# Patient Record
Sex: MALE
[De-identification: names, ages, dates, MRNs, and addresses within clinical notes are randomized per-mention and may not be internally consistent; named-entity substitution may affect disease eponyms.]

---

## 2017-06-15 PROBLEM — M25.562 PAIN IN LEFT KNEE: Status: ACTIVE | Noted: 2017-06-15

## 2017-06-15 PROBLEM — Z00.00 ENCOUNTER FOR PREVENTIVE HEALTH EXAMINATION: Status: ACTIVE | Noted: 2017-06-15

## 2017-06-19 ENCOUNTER — APPOINTMENT (OUTPATIENT)
Dept: ORTHOPEDIC SURGERY | Facility: CLINIC | Age: 67
End: 2017-06-19

## 2017-06-19 VITALS
DIASTOLIC BLOOD PRESSURE: 88 MMHG | HEIGHT: 71 IN | SYSTOLIC BLOOD PRESSURE: 173 MMHG | WEIGHT: 217 LBS | HEART RATE: 49 BPM | BODY MASS INDEX: 30.38 KG/M2

## 2017-06-19 DIAGNOSIS — Z86.39 PERSONAL HISTORY OF OTHER ENDOCRINE, NUTRITIONAL AND METABOLIC DISEASE: ICD-10-CM

## 2017-06-19 DIAGNOSIS — Z80.9 FAMILY HISTORY OF MALIGNANT NEOPLASM, UNSPECIFIED: ICD-10-CM

## 2017-06-19 DIAGNOSIS — Z86.79 PERSONAL HISTORY OF OTHER DISEASES OF THE CIRCULATORY SYSTEM: ICD-10-CM

## 2017-06-19 DIAGNOSIS — Z87.39 PERSONAL HISTORY OF OTHER DISEASES OF THE MUSCULOSKELETAL SYSTEM AND CONNECTIVE TISSUE: ICD-10-CM

## 2017-06-19 DIAGNOSIS — Z96.652 PRESENCE OF LEFT ARTIFICIAL KNEE JOINT: ICD-10-CM

## 2017-06-19 DIAGNOSIS — M25.562 PAIN IN LEFT KNEE: ICD-10-CM

## 2017-06-19 DIAGNOSIS — Z85.9 PERSONAL HISTORY OF MALIGNANT NEOPLASM, UNSPECIFIED: ICD-10-CM

## 2017-06-19 DIAGNOSIS — Z78.9 OTHER SPECIFIED HEALTH STATUS: ICD-10-CM

## 2017-06-19 DIAGNOSIS — E78.00 PURE HYPERCHOLESTEROLEMIA, UNSPECIFIED: ICD-10-CM

## 2017-06-19 DIAGNOSIS — Z82.61 FAMILY HISTORY OF ARTHRITIS: ICD-10-CM

## 2017-06-19 DIAGNOSIS — Z96.651 PRESENCE OF RIGHT ARTIFICIAL KNEE JOINT: ICD-10-CM

## 2017-06-19 DIAGNOSIS — M76.892 OTHER SPECIFIED ENTHESOPATHIES OF LEFT LOWER LIMB, EXCLUDING FOOT: ICD-10-CM

## 2017-06-19 DIAGNOSIS — M17.0 BILATERAL PRIMARY OSTEOARTHRITIS OF KNEE: ICD-10-CM

## 2017-06-19 RX ORDER — ASPIRIN 81 MG/1
81 TABLET, CHEWABLE ORAL
Refills: 0 | Status: ACTIVE | COMMUNITY

## 2017-06-19 RX ORDER — METFORMIN HYDROCHLORIDE 500 MG/1
500 TABLET, COATED ORAL
Refills: 0 | Status: ACTIVE | COMMUNITY

## 2017-06-19 RX ORDER — VALSARTAN 40 MG/1
40 TABLET, COATED ORAL
Refills: 0 | Status: ACTIVE | COMMUNITY

## 2017-06-19 RX ORDER — ATORVASTATIN CALCIUM 10 MG/1
10 TABLET, FILM COATED ORAL
Refills: 0 | Status: ACTIVE | COMMUNITY

## 2017-12-27 VITALS
OXYGEN SATURATION: 97 % | RESPIRATION RATE: 16 BRPM | HEART RATE: 68 BPM | WEIGHT: 220.9 LBS | SYSTOLIC BLOOD PRESSURE: 164 MMHG | TEMPERATURE: 97 F | HEIGHT: 71 IN | DIASTOLIC BLOOD PRESSURE: 79 MMHG

## 2017-12-27 NOTE — PATIENT PROFILE ADULT. - PMH
Bladder cancer    CAD (coronary artery disease)    Depression    Diabetes    High triglycerides    HLD (hyperlipidemia)    HTN (hypertension)    NEHA on CPAP    Prostate cancer

## 2017-12-27 NOTE — PATIENT PROFILE ADULT. - PSH
S/P Achilles tendon repair  Right, 2010  S/P hernia surgery  umbilical, 2015  S/P knee replacement  Right, 2010  S/P knee replacement  left, 2011  Surgery, elective  Left achilles tendon, 2011  Surgery, elective  prostate curgery, 2012  Surgery, elective  Bladder surgery, 1986  Surgery, elective  Kidney stone, 2005  Surgery, elective  Left elbow surgery, 2010  Surgery, elective  Right elbow surgery, 2002  Surgery, elective  cardiac Stent x3 Elective surgery  neck mass removed-2015  S/P Achilles tendon repair  Right, 2010  S/P hernia surgery  umbilical, 2015  S/P knee replacement  Right, 2010  S/P knee replacement  left, 2011  Surgery, elective  Left achilles tendon, 2011  Surgery, elective  prostate curgery, 2012  Surgery, elective  Bladder surgery, 1986  Surgery, elective  Kidney stone, 2005  Surgery, elective  Left elbow surgery, 2010  Surgery, elective  Right elbow surgery, 2002  Surgery, elective  cardiac Stent x3

## 2017-12-28 ENCOUNTER — INPATIENT (INPATIENT)
Facility: HOSPITAL | Age: 67
LOS: 0 days | Discharge: HOME | DRG: 710 | End: 2017-12-29
Attending: UROLOGY | Admitting: UROLOGY
Payer: COMMERCIAL

## 2017-12-28 DIAGNOSIS — Z41.9 ENCOUNTER FOR PROCEDURE FOR PURPOSES OTHER THAN REMEDYING HEALTH STATE, UNSPECIFIED: Chronic | ICD-10-CM

## 2017-12-28 DIAGNOSIS — Z96.659 PRESENCE OF UNSPECIFIED ARTIFICIAL KNEE JOINT: Chronic | ICD-10-CM

## 2017-12-28 DIAGNOSIS — N52.31 ERECTILE DYSFUNCTION FOLLOWING RADICAL PROSTATECTOMY: ICD-10-CM

## 2017-12-28 DIAGNOSIS — Z98.890 OTHER SPECIFIED POSTPROCEDURAL STATES: Chronic | ICD-10-CM

## 2017-12-28 LAB
GLUCOSE BLDC GLUCOMTR-MCNC: 111 MG/DL — HIGH (ref 70–99)
GLUCOSE BLDC GLUCOMTR-MCNC: 152 MG/DL — HIGH (ref 70–99)
GLUCOSE BLDC GLUCOMTR-MCNC: 155 MG/DL — HIGH (ref 70–99)
GLUCOSE BLDC GLUCOMTR-MCNC: 180 MG/DL — HIGH (ref 70–99)

## 2017-12-28 RX ORDER — GENTAMICIN SULFATE 40 MG/ML
80 VIAL (ML) INJECTION ONCE
Qty: 0 | Refills: 0 | Status: DISCONTINUED | OUTPATIENT
Start: 2017-12-28 | End: 2017-12-28

## 2017-12-28 RX ORDER — HYDROMORPHONE HYDROCHLORIDE 2 MG/ML
0.5 INJECTION INTRAMUSCULAR; INTRAVENOUS; SUBCUTANEOUS
Qty: 0 | Refills: 0 | Status: DISCONTINUED | OUTPATIENT
Start: 2017-12-28 | End: 2017-12-29

## 2017-12-28 RX ORDER — SODIUM CHLORIDE 9 MG/ML
1000 INJECTION, SOLUTION INTRAVENOUS
Qty: 0 | Refills: 0 | Status: DISCONTINUED | OUTPATIENT
Start: 2017-12-28 | End: 2017-12-29

## 2017-12-28 RX ORDER — VALSARTAN 80 MG/1
160 TABLET ORAL DAILY
Qty: 0 | Refills: 0 | Status: DISCONTINUED | OUTPATIENT
Start: 2017-12-28 | End: 2017-12-29

## 2017-12-28 RX ORDER — INSULIN LISPRO 100/ML
VIAL (ML) SUBCUTANEOUS
Qty: 0 | Refills: 0 | Status: DISCONTINUED | OUTPATIENT
Start: 2017-12-28 | End: 2017-12-29

## 2017-12-28 RX ORDER — DEXTROSE 50 % IN WATER 50 %
25 SYRINGE (ML) INTRAVENOUS ONCE
Qty: 0 | Refills: 0 | Status: DISCONTINUED | OUTPATIENT
Start: 2017-12-28 | End: 2017-12-29

## 2017-12-28 RX ORDER — DEXTROSE 50 % IN WATER 50 %
12.5 SYRINGE (ML) INTRAVENOUS ONCE
Qty: 0 | Refills: 0 | Status: DISCONTINUED | OUTPATIENT
Start: 2017-12-28 | End: 2017-12-29

## 2017-12-28 RX ORDER — GLUCAGON INJECTION, SOLUTION 0.5 MG/.1ML
1 INJECTION, SOLUTION SUBCUTANEOUS ONCE
Qty: 0 | Refills: 0 | Status: DISCONTINUED | OUTPATIENT
Start: 2017-12-28 | End: 2017-12-29

## 2017-12-28 RX ORDER — HEPARIN SODIUM 5000 [USP'U]/ML
5000 INJECTION INTRAVENOUS; SUBCUTANEOUS EVERY 8 HOURS
Qty: 0 | Refills: 0 | Status: DISCONTINUED | OUTPATIENT
Start: 2017-12-28 | End: 2017-12-29

## 2017-12-28 RX ORDER — METFORMIN HYDROCHLORIDE 850 MG/1
1000 TABLET ORAL EVERY 12 HOURS
Qty: 0 | Refills: 0 | Status: DISCONTINUED | OUTPATIENT
Start: 2017-12-28 | End: 2017-12-28

## 2017-12-28 RX ORDER — OXYCODONE AND ACETAMINOPHEN 5; 325 MG/1; MG/1
1 TABLET ORAL EVERY 4 HOURS
Qty: 0 | Refills: 0 | Status: DISCONTINUED | OUTPATIENT
Start: 2017-12-28 | End: 2017-12-29

## 2017-12-28 RX ORDER — VALSARTAN 80 MG/1
1 TABLET ORAL
Qty: 0 | Refills: 0 | COMMUNITY

## 2017-12-28 RX ORDER — VANCOMYCIN HCL 1 G
1000 VIAL (EA) INTRAVENOUS ONCE
Qty: 0 | Refills: 0 | Status: COMPLETED | OUTPATIENT
Start: 2017-12-28 | End: 2017-12-28

## 2017-12-28 RX ORDER — ATORVASTATIN CALCIUM 80 MG/1
40 TABLET, FILM COATED ORAL AT BEDTIME
Qty: 0 | Refills: 0 | Status: DISCONTINUED | OUTPATIENT
Start: 2017-12-28 | End: 2017-12-29

## 2017-12-28 RX ORDER — OXYCODONE AND ACETAMINOPHEN 5; 325 MG/1; MG/1
2 TABLET ORAL EVERY 6 HOURS
Qty: 0 | Refills: 0 | Status: DISCONTINUED | OUTPATIENT
Start: 2017-12-28 | End: 2017-12-29

## 2017-12-28 RX ORDER — METFORMIN HYDROCHLORIDE 850 MG/1
2000 TABLET ORAL
Qty: 0 | Refills: 0 | COMMUNITY

## 2017-12-28 RX ORDER — SODIUM CHLORIDE 9 MG/ML
1000 INJECTION INTRAMUSCULAR; INTRAVENOUS; SUBCUTANEOUS
Qty: 0 | Refills: 0 | Status: DISCONTINUED | OUTPATIENT
Start: 2017-12-28 | End: 2017-12-28

## 2017-12-28 RX ORDER — DEXTROSE 50 % IN WATER 50 %
1 SYRINGE (ML) INTRAVENOUS ONCE
Qty: 0 | Refills: 0 | Status: DISCONTINUED | OUTPATIENT
Start: 2017-12-28 | End: 2017-12-29

## 2017-12-28 RX ORDER — SENNA PLUS 8.6 MG/1
1 TABLET ORAL AT BEDTIME
Qty: 0 | Refills: 0 | Status: DISCONTINUED | OUTPATIENT
Start: 2017-12-28 | End: 2017-12-29

## 2017-12-28 RX ORDER — ATORVASTATIN CALCIUM 80 MG/1
1 TABLET, FILM COATED ORAL
Qty: 0 | Refills: 0 | COMMUNITY

## 2017-12-28 RX ORDER — SODIUM CHLORIDE 9 MG/ML
3 INJECTION INTRAMUSCULAR; INTRAVENOUS; SUBCUTANEOUS EVERY 8 HOURS
Qty: 0 | Refills: 0 | Status: DISCONTINUED | OUTPATIENT
Start: 2017-12-28 | End: 2017-12-29

## 2017-12-28 RX ORDER — ONDANSETRON 8 MG/1
4 TABLET, FILM COATED ORAL EVERY 6 HOURS
Qty: 0 | Refills: 0 | Status: DISCONTINUED | OUTPATIENT
Start: 2017-12-28 | End: 2017-12-29

## 2017-12-28 RX ORDER — CEPHALEXIN 500 MG
500 CAPSULE ORAL
Qty: 0 | Refills: 0 | Status: DISCONTINUED | OUTPATIENT
Start: 2017-12-28 | End: 2017-12-29

## 2017-12-28 RX ORDER — ASPIRIN/CALCIUM CARB/MAGNESIUM 324 MG
1 TABLET ORAL
Qty: 0 | Refills: 0 | COMMUNITY

## 2017-12-28 RX ORDER — DOCUSATE SODIUM 100 MG
100 CAPSULE ORAL THREE TIMES A DAY
Qty: 0 | Refills: 0 | Status: DISCONTINUED | OUTPATIENT
Start: 2017-12-28 | End: 2017-12-29

## 2017-12-28 RX ADMIN — VALSARTAN 160 MILLIGRAM(S): 80 TABLET ORAL at 18:45

## 2017-12-28 RX ADMIN — OXYCODONE AND ACETAMINOPHEN 2 TABLET(S): 5; 325 TABLET ORAL at 22:40

## 2017-12-28 RX ADMIN — OXYCODONE AND ACETAMINOPHEN 2 TABLET(S): 5; 325 TABLET ORAL at 23:10

## 2017-12-28 RX ADMIN — Medication 500 MILLIGRAM(S): at 18:45

## 2017-12-28 RX ADMIN — SODIUM CHLORIDE 100 MILLILITER(S): 9 INJECTION INTRAMUSCULAR; INTRAVENOUS; SUBCUTANEOUS at 14:09

## 2017-12-28 RX ADMIN — HEPARIN SODIUM 5000 UNIT(S): 5000 INJECTION INTRAVENOUS; SUBCUTANEOUS at 14:57

## 2017-12-28 RX ADMIN — Medication 250 MILLIGRAM(S): at 07:19

## 2017-12-28 RX ADMIN — HEPARIN SODIUM 5000 UNIT(S): 5000 INJECTION INTRAVENOUS; SUBCUTANEOUS at 22:29

## 2017-12-28 RX ADMIN — SENNA PLUS 1 TABLET(S): 8.6 TABLET ORAL at 22:28

## 2017-12-28 RX ADMIN — Medication: at 12:45

## 2017-12-28 RX ADMIN — SODIUM CHLORIDE 3 MILLILITER(S): 9 INJECTION INTRAMUSCULAR; INTRAVENOUS; SUBCUTANEOUS at 22:30

## 2017-12-28 RX ADMIN — ATORVASTATIN CALCIUM 40 MILLIGRAM(S): 80 TABLET, FILM COATED ORAL at 22:28

## 2017-12-28 RX ADMIN — Medication 100 MILLIGRAM(S): at 22:29

## 2017-12-28 RX ADMIN — Medication 1: at 22:29

## 2017-12-28 NOTE — BRIEF OPERATIVE NOTE - PROCEDURE
<<-----Click on this checkbox to enter Procedure Penile prosthesis placement  12/28/2017  multi-component penile prosthesis  Active  [id number]

## 2017-12-28 NOTE — PROGRESS NOTE ADULT - SUBJECTIVE AND OBJECTIVE BOX
POC    Patient is a 67y old  Male who presents with a chief complaint of erectile dysfunction (28 Dec 2017 11:47) s/p IPP            Vital Signs Last 24 Hrs  T(C): 35.9 (28 Dec 2017 14:46), Max: 36.4 (28 Dec 2017 13:30)  T(F): 96.7 (28 Dec 2017 14:46), Max: 97.5 (28 Dec 2017 13:30)  HR: 83 (28 Dec 2017 14:46) (48 - 83)  BP: 177/84 (28 Dec 2017 14:46) (105/60 - 177/84)  BP(mean): 93 (28 Dec 2017 14:00) (77 - 104)  RR: 19 (28 Dec 2017 14:46) (10 - 19)  SpO2: 100% (28 Dec 2017 14:46) (96% - 100%)    I&O's Summary    28 Dec 2017 07:01  -  28 Dec 2017 15:00  --------------------------------------------------------  IN: 300 mL / OUT: 300 mL / NET: 0 mL        Gen:    Abd:    :            cultures    A/P  POC    Patient is a 67y old  Male who presents with a chief complaint of erectile dysfunction (28 Dec 2017 11:47) s/p IPP under spinal anesthesia    Pt reports sensation returning to lower extremities. He denies CP, SOB, n/v    Vital Signs Last 24 Hrs  T(C): 35.9 (28 Dec 2017 14:46), Max: 36.4 (28 Dec 2017 13:30)  T(F): 96.7 (28 Dec 2017 14:46), Max: 97.5 (28 Dec 2017 13:30)  HR: 83 (28 Dec 2017 14:46) (48 - 83)  BP: 177/84 (28 Dec 2017 14:46) (105/60 - 177/84)  BP(mean): 93 (28 Dec 2017 14:00) (77 - 104)  RR: 19 (28 Dec 2017 14:46) (10 - 19)  SpO2: 100% (28 Dec 2017 14:46) (96% - 100%)    I&O's Summary    28 Dec 2017 07:01  -  28 Dec 2017 15:00  --------------------------------------------------------  IN: 300 mL / OUT: 300 mL / NET: 0 mL        Gen: NAD    Abd: soft, NTND, no guarding    : FC intact and draining clear yellow urine, louise ttp, +scrotal support       cultures    A/P  67M s/p IPP  -cont abx  -monitor UO  -dvt ppx  -scrotal support  -ISS  -DM diet  -bowel regimen  -pain meds PRN

## 2017-12-28 NOTE — H&P ADULT - PSH
Elective surgery  neck mass removed-2015  S/P Achilles tendon repair  Right, 2010  S/P hernia surgery  umbilical, 2015  S/P knee replacement  Right, 2010  S/P knee replacement  left, 2011  Surgery, elective  Left achilles tendon, 2011  Surgery, elective  prostate curgery, 2012  Surgery, elective  Bladder surgery, 1986  Surgery, elective  Kidney stone, 2005  Surgery, elective  Left elbow surgery, 2010  Surgery, elective  Right elbow surgery, 2002  Surgery, elective  cardiac Stent x3

## 2017-12-28 NOTE — H&P ADULT - HISTORY OF PRESENT ILLNESS
67 y.o. gentleman s/p radical prostatectomy for prostate cancer with erectile dysfunction of vasculogenic cause as well. Patient attempted to use medical therapy for this erectile dysfunction but due to lack of efficacy, he elected to have a multi-component inflatable penile prosthesis placed instead.

## 2017-12-29 ENCOUNTER — TRANSCRIPTION ENCOUNTER (OUTPATIENT)
Age: 67
End: 2017-12-29

## 2017-12-29 VITALS
HEART RATE: 81 BPM | DIASTOLIC BLOOD PRESSURE: 74 MMHG | OXYGEN SATURATION: 100 % | RESPIRATION RATE: 16 BRPM | SYSTOLIC BLOOD PRESSURE: 137 MMHG | TEMPERATURE: 99 F

## 2017-12-29 LAB
ALBUMIN SERPL ELPH-MCNC: 3.6 G/DL — SIGNIFICANT CHANGE UP (ref 3.3–5)
ALP SERPL-CCNC: 39 U/L — LOW (ref 40–120)
ALT FLD-CCNC: 12 U/L — SIGNIFICANT CHANGE UP (ref 10–45)
ANION GAP SERPL CALC-SCNC: 10 MMOL/L — SIGNIFICANT CHANGE UP (ref 5–17)
AST SERPL-CCNC: 13 U/L — SIGNIFICANT CHANGE UP (ref 10–40)
BASOPHILS NFR BLD AUTO: 0.9 % — SIGNIFICANT CHANGE UP (ref 0–2)
BILIRUB SERPL-MCNC: 0.6 MG/DL — SIGNIFICANT CHANGE UP (ref 0.2–1.2)
BUN SERPL-MCNC: 34 MG/DL — HIGH (ref 7–23)
CALCIUM SERPL-MCNC: 9.4 MG/DL — SIGNIFICANT CHANGE UP (ref 8.4–10.5)
CHLORIDE SERPL-SCNC: 102 MMOL/L — SIGNIFICANT CHANGE UP (ref 96–108)
CO2 SERPL-SCNC: 24 MMOL/L — SIGNIFICANT CHANGE UP (ref 22–31)
CREAT SERPL-MCNC: 1.95 MG/DL — HIGH (ref 0.5–1.3)
EOSINOPHIL NFR BLD AUTO: 1.8 % — SIGNIFICANT CHANGE UP (ref 0–6)
GLUCOSE BLDC GLUCOMTR-MCNC: 134 MG/DL — HIGH (ref 70–99)
GLUCOSE BLDC GLUCOMTR-MCNC: 212 MG/DL — HIGH (ref 70–99)
GLUCOSE SERPL-MCNC: 172 MG/DL — HIGH (ref 70–99)
HBA1C BLD-MCNC: 7.6 % — HIGH (ref 4–5.6)
HCT VFR BLD CALC: 39.3 % — SIGNIFICANT CHANGE UP (ref 39–50)
HGB BLD-MCNC: 13 G/DL — SIGNIFICANT CHANGE UP (ref 13–17)
LYMPHOCYTES # BLD AUTO: 13.4 % — SIGNIFICANT CHANGE UP (ref 13–44)
MAGNESIUM SERPL-MCNC: 1.6 MG/DL — SIGNIFICANT CHANGE UP (ref 1.6–2.6)
MCHC RBC-ENTMCNC: 29.5 PG — SIGNIFICANT CHANGE UP (ref 27–34)
MCHC RBC-ENTMCNC: 33.1 G/DL — SIGNIFICANT CHANGE UP (ref 32–36)
MCV RBC AUTO: 89.3 FL — SIGNIFICANT CHANGE UP (ref 80–100)
MONOCYTES NFR BLD AUTO: 9.4 % — SIGNIFICANT CHANGE UP (ref 2–14)
NEUTROPHILS NFR BLD AUTO: 74.5 % — SIGNIFICANT CHANGE UP (ref 43–77)
PHOSPHATE SERPL-MCNC: 2.5 MG/DL — SIGNIFICANT CHANGE UP (ref 2.5–4.5)
PLATELET # BLD AUTO: 155 K/UL — SIGNIFICANT CHANGE UP (ref 150–400)
POTASSIUM SERPL-MCNC: 5 MMOL/L — SIGNIFICANT CHANGE UP (ref 3.5–5.3)
POTASSIUM SERPL-SCNC: 5 MMOL/L — SIGNIFICANT CHANGE UP (ref 3.5–5.3)
PROT SERPL-MCNC: 6.2 G/DL — SIGNIFICANT CHANGE UP (ref 6–8.3)
RBC # BLD: 4.4 M/UL — SIGNIFICANT CHANGE UP (ref 4.2–5.8)
RBC # FLD: 14.6 % — SIGNIFICANT CHANGE UP (ref 10.3–16.9)
SODIUM SERPL-SCNC: 136 MMOL/L — SIGNIFICANT CHANGE UP (ref 135–145)
WBC # BLD: 11.5 K/UL — HIGH (ref 3.8–10.5)
WBC # FLD AUTO: 11.5 K/UL — HIGH (ref 3.8–10.5)

## 2017-12-29 PROCEDURE — 90662 IIV NO PRSV INCREASED AG IM: CPT

## 2017-12-29 PROCEDURE — 82962 GLUCOSE BLOOD TEST: CPT

## 2017-12-29 PROCEDURE — C1813: CPT

## 2017-12-29 PROCEDURE — 54405 INSERT MULTI-COMP PENIS PROS: CPT

## 2017-12-29 PROCEDURE — 84100 ASSAY OF PHOSPHORUS: CPT

## 2017-12-29 PROCEDURE — 83735 ASSAY OF MAGNESIUM: CPT

## 2017-12-29 PROCEDURE — 36415 COLL VENOUS BLD VENIPUNCTURE: CPT

## 2017-12-29 PROCEDURE — 83036 HEMOGLOBIN GLYCOSYLATED A1C: CPT

## 2017-12-29 PROCEDURE — 80053 COMPREHEN METABOLIC PANEL: CPT

## 2017-12-29 PROCEDURE — 85025 COMPLETE CBC W/AUTO DIFF WBC: CPT

## 2017-12-29 RX ORDER — CEPHALEXIN 500 MG
1 CAPSULE ORAL
Qty: 21 | Refills: 0 | OUTPATIENT
Start: 2017-12-29 | End: 2018-01-04

## 2017-12-29 RX ORDER — MAGNESIUM SULFATE 500 MG/ML
1 VIAL (ML) INJECTION ONCE
Qty: 0 | Refills: 0 | Status: COMPLETED | OUTPATIENT
Start: 2017-12-29 | End: 2017-12-29

## 2017-12-29 RX ORDER — DOCUSATE SODIUM 100 MG
1 CAPSULE ORAL
Qty: 30 | Refills: 0 | OUTPATIENT
Start: 2017-12-29

## 2017-12-29 RX ORDER — INFLUENZA VIRUS VACCINE 15; 15; 15; 15 UG/.5ML; UG/.5ML; UG/.5ML; UG/.5ML
0.5 SUSPENSION INTRAMUSCULAR ONCE
Qty: 0 | Refills: 0 | Status: COMPLETED | OUTPATIENT
Start: 2017-12-29 | End: 2017-12-29

## 2017-12-29 RX ADMIN — OXYCODONE AND ACETAMINOPHEN 2 TABLET(S): 5; 325 TABLET ORAL at 06:54

## 2017-12-29 RX ADMIN — SODIUM CHLORIDE 3 MILLILITER(S): 9 INJECTION INTRAMUSCULAR; INTRAVENOUS; SUBCUTANEOUS at 06:26

## 2017-12-29 RX ADMIN — HEPARIN SODIUM 5000 UNIT(S): 5000 INJECTION INTRAVENOUS; SUBCUTANEOUS at 13:19

## 2017-12-29 RX ADMIN — Medication 100 GRAM(S): at 10:26

## 2017-12-29 RX ADMIN — VALSARTAN 160 MILLIGRAM(S): 80 TABLET ORAL at 06:25

## 2017-12-29 RX ADMIN — Medication 100 MILLIGRAM(S): at 06:23

## 2017-12-29 RX ADMIN — INFLUENZA VIRUS VACCINE 0.5 MILLILITER(S): 15; 15; 15; 15 SUSPENSION INTRAMUSCULAR at 15:31

## 2017-12-29 RX ADMIN — HEPARIN SODIUM 5000 UNIT(S): 5000 INJECTION INTRAVENOUS; SUBCUTANEOUS at 06:23

## 2017-12-29 RX ADMIN — OXYCODONE AND ACETAMINOPHEN 2 TABLET(S): 5; 325 TABLET ORAL at 06:24

## 2017-12-29 RX ADMIN — SODIUM CHLORIDE 3 MILLILITER(S): 9 INJECTION INTRAMUSCULAR; INTRAVENOUS; SUBCUTANEOUS at 13:17

## 2017-12-29 RX ADMIN — Medication 2: at 08:16

## 2017-12-29 RX ADMIN — Medication 500 MILLIGRAM(S): at 00:48

## 2017-12-29 RX ADMIN — Medication 100 MILLIGRAM(S): at 13:19

## 2017-12-29 RX ADMIN — Medication 500 MILLIGRAM(S): at 06:23

## 2017-12-29 RX ADMIN — Medication 500 MILLIGRAM(S): at 11:26

## 2017-12-29 NOTE — DISCHARGE NOTE ADULT - PLAN OF CARE
Hydration, pain and infection control Do not restart any anticoagulation medications until your surgeon approves. If you have uncontrollable pain or vomiting, fever > 100.4, unable to void or any acute problems, please call MD or go to ER. Stay well hydrated and complete antibiotic course in full. Follow up with Dr. Contreras as discussed with him. Do not restart any anticoagulation medications until your surgeon approves. If you have uncontrollable pain or vomiting, fever > 100.4, unable to void or any acute problems, please call MD or go to ER. Stay well hydrated and complete antibiotic course in full. Follow up with Dr. Contreras as discussed.

## 2017-12-29 NOTE — DISCHARGE NOTE ADULT - MEDICATION SUMMARY - MEDICATIONS TO TAKE
I will START or STAY ON the medications listed below when I get home from the hospital:    Aspir 81 oral delayed release tablet  -- 1 tab(s) by mouth once a day  -- Indication: For Do not restart until your doctor tells you to    Percocet 5/325 oral tablet  -- 1 tab(s) by mouth every 6 hours, As Needed -for severe pain MDD:5   -- Caution federal law prohibits the transfer of this drug to any person other  than the person for whom it was prescribed.  May cause drowsiness.  Alcohol may intensify this effect.  Use care when operating dangerous machinery.  This prescription cannot be refilled.  This product contains acetaminophen.  Do not use  with any other product containing acetaminophen to prevent possible liver damage.  Using more of this medication than prescribed may cause serious breathing problems.    -- Indication: For As needed for severe pain    valsartan 160 mg oral tablet  -- 1 tab(s) by mouth once a day  -- Indication: For Home med    metFORMIN  -- 2000 milligram(s) by mouth once a day  -- Indication: For Home med    atorvastatin 40 mg oral tablet  -- 1 tab(s) by mouth once a day  -- Indication: For Home med    Keflex 500 mg oral capsule  -- 1 cap(s) by mouth every 8 hours   -- Finish all this medication unless otherwise directed by prescriber.    -- Indication: For antibiotic     Colace 100 mg oral capsule  -- 1 cap(s) by mouth 3 times a day, As Needed -for constipation   -- Medication should be taken with plenty of water.    -- Indication: For as needed for constipation

## 2017-12-29 NOTE — DISCHARGE NOTE ADULT - CARE PROVIDER_API CALL
Fany Contreras), Urology  [address] [place] [address]  Phone: [phone]  Fax: [phone]

## 2017-12-29 NOTE — DISCHARGE NOTE ADULT - PATIENT PORTAL LINK FT
“You can access the [facility] Patient Portal, offered by [facility], by registering with the following website: [email or web address]”

## 2017-12-29 NOTE — DISCHARGE NOTE ADULT - HOSPITAL COURSE
67M c/o ED underwent IPP 12/28 without complication. Pt tolerated procedure well, is hemodynamically stable with AVSS. He is tolerating PO and passed a TOV with low residual. He is to follow up as outpt with Dr. Contreras.

## 2018-01-02 DIAGNOSIS — E78.5 HYPERLIPIDEMIA, UNSPECIFIED: ICD-10-CM

## 2018-01-02 DIAGNOSIS — N52.31 ERECTILE DYSFUNCTION FOLLOWING RADICAL PROSTATECTOMY: ICD-10-CM

## 2018-01-02 DIAGNOSIS — N52.01 ERECTILE DYSFUNCTION DUE TO ARTERIAL INSUFFICIENCY: ICD-10-CM

## 2018-01-02 DIAGNOSIS — G47.33 OBSTRUCTIVE SLEEP APNEA (ADULT) (PEDIATRIC): ICD-10-CM

## 2018-01-02 DIAGNOSIS — E11.9 TYPE 2 DIABETES MELLITUS WITHOUT COMPLICATIONS: ICD-10-CM

## 2018-01-02 DIAGNOSIS — Z95.5 PRESENCE OF CORONARY ANGIOPLASTY IMPLANT AND GRAFT: ICD-10-CM

## 2018-01-02 DIAGNOSIS — I25.10 ATHEROSCLEROTIC HEART DISEASE OF NATIVE CORONARY ARTERY WITHOUT ANGINA PECTORIS: ICD-10-CM

## 2018-01-02 DIAGNOSIS — Z85.46 PERSONAL HISTORY OF MALIGNANT NEOPLASM OF PROSTATE: ICD-10-CM

## 2018-01-02 DIAGNOSIS — I10 ESSENTIAL (PRIMARY) HYPERTENSION: ICD-10-CM

## 2018-01-02 DIAGNOSIS — Z79.84 LONG TERM (CURRENT) USE OF ORAL HYPOGLYCEMIC DRUGS: ICD-10-CM

## 2018-04-16 NOTE — DISCHARGE NOTE ADULT - CARE PLAN
Chief complaint:   Chief Complaint   Patient presents with   • Hyperlipidemia     3 month follow up       Vitals:  Visit Vitals  /86 (BP Location: [facility] [place], Patient Position: Sitting, Cuff Size: Regular)   Pulse 65   Ht 6' 1\" (1.854 m)   Wt 89.4 kg   SpO2 95%   BMI 26.00 kg/m²       HISTORY OF PRESENT ILLNESS     Patient here for followup on hypertension, hyperlipidemia and coronary artery disease.  In general patient feeling well. Is still smoking yet and still drinks regularly. He is taking his medications regularly and not noticing side effects. He did not get labs done prior to this visit.       Hyperlipidemia   Pertinent negatives include no chest pain or shortness of breath.   Hypertension   Pertinent negatives include no chest pain, headaches, palpitations or shortness of breath.   MEDICATION ISSUE   Pertinent negatives include no chest pain, chills, fatigue, fever, headaches, nausea or vomiting.       Other significant problems:  Patient Active Problem List    Diagnosis Date Noted   • Hyperglycemia 03/05/2014     Priority: Medium   • Vitamin D deficiency 10/07/2013     Priority: Medium   • Somatic dysfunction of spine, lumbar 04/17/2013     Priority: Medium   • Somatic dysfunction of spine, sacral 04/17/2013     Priority: Medium   • Somatic dysfunction of spine, thoracic 04/17/2013     Priority: Medium   • Physical exam, routine 11/12/2012     Priority: Medium   • Back pain 04/17/2013     Priority: Low   • Lumbosacral strain 04/17/2013     Priority: Low   • Tobacco use disorder 11/12/2012     Priority: Low   • HTN (hypertension)      Priority: Low   • Hyperlipidemia      Priority: Low   • CAD (coronary artery disease)      Priority: Low       PAST MEDICAL, FAMILY AND SOCIAL HISTORY     Medications:  Current Outpatient Prescriptions   Medication   • pravastatin (PRAVACHOL) 40 MG tablet   • lisinopril (ZESTRIL) 10 MG tablet   • carvedilol (COREG) 12.5 MG tablet   • aspirin 81 MG tablet   • colchicine  (COLCRYS) 0.6 MG tablet   • cyclobenzaprine (FLEXERIL) 10 MG tablet     No current facility-administered medications for this visit.        Allergies:  ALLERGIES:  No Known Allergies    Past Medical  History/Surgeries:  Past Medical History:   Diagnosis Date   • CAD (coronary artery disease)    • Chronic back pain    • Chronic sprain or strain of lumbosacral region    • Depression    • Eczema    • HTN (hypertension)    • Hyperlipidemia    • Panic attacks    • Somatic dysfunction of lumbar region    • Somatic dysfunction of sacral region    • Somatic dysfunction of thoracic region    • Tobacco dependence        Past Surgical History:   Procedure Laterality Date   • Left heart cath,percutaneous  01/01/2007    Cardiac Cath       Family History:  Family History   Problem Relation Age of Onset   • Cancer Mother      breast   • Heart disease Mother    • Heart disease Father    • Cancer Father      brain   • Stroke Father    • Cancer Paternal Grandfather      brain   • Diabetes Neg Hx        Social History:  Social History   Substance Use Topics   • Smoking status: Current Every Day Smoker     Packs/day: 1.00     Years: 33.00     Types: Cigarettes   • Smokeless tobacco: Never Used      Comment: 1-800-quit-now   • Alcohol use 6.0 oz/week     10 Cans of beer per week       REVIEW OF SYSTEMS     Review of Systems   Constitutional: Negative.  Negative for chills, fatigue, fever and unexpected weight change.   Respiratory: Negative.  Negative for chest tightness, shortness of breath and wheezing.    Cardiovascular: Negative.  Negative for chest pain, palpitations and leg swelling.   Gastrointestinal: Negative.  Negative for diarrhea, nausea and vomiting.   Neurological: Negative.  Negative for dizziness and headaches.       PHYSICAL EXAM     Physical Exam   Constitutional: He appears well-developed and well-nourished. He is cooperative. No distress.   Tobacco odor noted   Neck: Normal range of motion. Neck supple. Carotid bruit  is not present. No neck rigidity. No tracheal deviation present. No thyromegaly present.   Cardiovascular: Normal rate, regular rhythm, S1 normal, S2 normal, normal heart sounds, intact distal pulses and normal pulses.  Exam reveals no gallop and no friction rub.    No murmur heard.  Pulmonary/Chest: Effort normal and breath sounds normal. No respiratory distress. He has no wheezes. He has no rhonchi. He has no rales.   Abdominal: Soft. Bowel sounds are normal. He exhibits no mass. There is no hepatosplenomegaly. There is no tenderness. There is no rigidity and no rebound. No hernia.   Musculoskeletal: He exhibits no edema.   Lymphadenopathy:     He has no cervical adenopathy.        Right: No supraclavicular adenopathy present.        Left: No supraclavicular adenopathy present.   Neurological: He is alert.   Skin: No cyanosis. Nails show no clubbing.   Nursing note and vitals reviewed.      ASSESSMENT/PLAN     Essential hypertension    Mixed hyperlipidemia    Coronary artery disease involving native coronary artery of native heart without angina pectoris    Vitamin D deficiency    Tobacco use disorder  -     Encouraged patient to stop smoking and discussed treatment options.    Await lab resutls    Return in about 3 months (around 7/16/2018).     Principal Discharge DX:	Erectile dysfunction after radical prostatectomy  Goal:	Hydration, pain and infection control  Instructions for follow-up, activity and diet:	Do not restart any anticoagulation medications until your surgeon approves. If you have uncontrollable pain or vomiting, fever > 100.4, unable to void or any acute problems, please call MD or go to ER. Stay well hydrated and complete antibiotic course in full. Follow up with Dr. Contreras as discussed with him. Principal Discharge DX:	Erectile dysfunction after radical prostatectomy  Goal:	Hydration, pain and infection control  Instructions for follow-up, activity and diet:	Do not restart any anticoagulation medications until your surgeon approves. If you have uncontrollable pain or vomiting, fever > 100.4, unable to void or any acute problems, please call MD or go to ER. Stay well hydrated and complete antibiotic course in full. Follow up with Dr. Contreras as discussed.

## 2019-01-17 NOTE — PATIENT PROFILE ADULT. - LONGEST PERIOD TOBACCO-FREE
Alert and oriented to person, place and time, memory intact, behavior appropriate to situation, PERRL. quit 1978

## 2022-02-01 ENCOUNTER — CONSULTATION/EVALUATION (OUTPATIENT)
Dept: URBAN - METROPOLITAN AREA CLINIC 43 | Facility: CLINIC | Age: 72
End: 2022-02-01

## 2022-02-01 DIAGNOSIS — H04.123: ICD-10-CM

## 2022-02-01 DIAGNOSIS — H25.812: ICD-10-CM

## 2022-02-01 DIAGNOSIS — H35.52: ICD-10-CM

## 2022-02-01 DIAGNOSIS — H25.811: ICD-10-CM

## 2022-02-01 PROCEDURE — 92015 DETERMINE REFRACTIVE STATE: CPT

## 2022-02-01 PROCEDURE — 92136TC INTERFEROMETRY - TECHNICAL COMPONENT

## 2022-02-01 PROCEDURE — 92134 CPTRZ OPH DX IMG PST SGM RTA: CPT

## 2022-02-01 PROCEDURE — V2799PMN IMPRIMIS PRED-MOXI-NEPAF 5ML

## 2022-02-01 PROCEDURE — 99204 OFFICE O/P NEW MOD 45 MIN: CPT

## 2022-02-01 ASSESSMENT — VISUAL ACUITY
OS_SC: J12
OD_BAT: 20/400
OD_SC: J12
OD_CC: 20/40
OD_RAM: 20/20
OD_CC: J1
OS_BAT: 20/400
OS_AM: 20/20
OS_CC: J1
OD_SC: 20/40-2
OS_CC: 20/40+1
OS_SC: 20/40-2

## 2022-02-01 ASSESSMENT — TONOMETRY
OD_IOP_MMHG: 12
OS_IOP_MMHG: 12

## 2022-03-01 ENCOUNTER — SURGERY/PROCEDURE (OUTPATIENT)
Dept: URBAN - METROPOLITAN AREA CLINIC 43 | Facility: CLINIC | Age: 72
End: 2022-03-01

## 2022-03-01 ENCOUNTER — PRE-OP/H&P (OUTPATIENT)
Dept: URBAN - METROPOLITAN AREA CLINIC 39 | Facility: CLINIC | Age: 72
End: 2022-03-01

## 2022-03-01 DIAGNOSIS — H25.811: ICD-10-CM

## 2022-03-01 PROCEDURE — 99211T TECH SERVICE

## 2022-03-01 PROCEDURE — 66984 XCAPSL CTRC RMVL W/O ECP: CPT

## 2022-03-02 ENCOUNTER — POST OP/EVAL OF SECOND EYE (OUTPATIENT)
Dept: URBAN - METROPOLITAN AREA CLINIC 39 | Facility: CLINIC | Age: 72
End: 2022-03-02

## 2022-03-02 DIAGNOSIS — Z96.1: ICD-10-CM

## 2022-03-02 DIAGNOSIS — H35.52: ICD-10-CM

## 2022-03-02 DIAGNOSIS — H04.123: ICD-10-CM

## 2022-03-02 DIAGNOSIS — H25.812: ICD-10-CM

## 2022-03-02 PROCEDURE — 99024 POSTOP FOLLOW-UP VISIT: CPT

## 2022-03-02 PROCEDURE — 92012 INTRM OPH EXAM EST PATIENT: CPT

## 2022-03-02 ASSESSMENT — VISUAL ACUITY
OS_SC: 20/40-2
OS_SC: J12
OS_CC: J1
OS_CC: 20/40+1
OS_BAT: 20/400
OD_SC: 20/40-2
OD_SC: J6

## 2022-03-02 ASSESSMENT — TONOMETRY
OD_IOP_MMHG: 12
OS_IOP_MMHG: 16

## 2022-03-02 NOTE — PATIENT DISCUSSION
Cataract surgery has been performed in the first eye and activities of daily living are still impaired. The patient would like to proceed with cataract surgery in the second eye as scheduled. The patient elects Basic OS, goal of [place].

## 2022-03-08 ENCOUNTER — PRE-OP/H&P (OUTPATIENT)
Dept: URBAN - METROPOLITAN AREA CLINIC 39 | Facility: CLINIC | Age: 72
End: 2022-03-08

## 2022-03-08 ENCOUNTER — SURGERY/PROCEDURE (OUTPATIENT)
Dept: URBAN - METROPOLITAN AREA CLINIC 43 | Facility: CLINIC | Age: 72
End: 2022-03-08

## 2022-03-08 DIAGNOSIS — H35.52: ICD-10-CM

## 2022-03-08 DIAGNOSIS — H25.812: ICD-10-CM

## 2022-03-08 DIAGNOSIS — H04.123: ICD-10-CM

## 2022-03-08 DIAGNOSIS — Z96.1: ICD-10-CM

## 2022-03-08 PROCEDURE — 99211T TECH SERVICE

## 2022-03-08 PROCEDURE — 66984 XCAPSL CTRC RMVL W/O ECP: CPT

## 2022-03-08 ASSESSMENT — VISUAL ACUITY
OS_BAT: 20/400
OS_SC: 20/40-2
OS_SC: J12
OD_SC: J10
OD_SC: 20/40

## 2022-03-08 ASSESSMENT — TONOMETRY
OS_IOP_MMHG: 15
OD_IOP_MMHG: 15

## 2022-03-09 ENCOUNTER — POST-OP (OUTPATIENT)
Dept: URBAN - METROPOLITAN AREA CLINIC 39 | Facility: CLINIC | Age: 72
End: 2022-03-09

## 2022-03-09 DIAGNOSIS — Z96.1: ICD-10-CM

## 2022-03-09 PROCEDURE — 99024 POSTOP FOLLOW-UP VISIT: CPT

## 2022-03-09 ASSESSMENT — VISUAL ACUITY
OS_SC: J2
OD_SC: J10
OS_SC: 20/40-1
OD_SC: 20/30-1

## 2022-03-09 ASSESSMENT — TONOMETRY
OD_IOP_MMHG: 14
OS_IOP_MMHG: 16

## 2022-04-27 ENCOUNTER — PREPPED CHART (OUTPATIENT)
Dept: URBAN - METROPOLITAN AREA CLINIC 39 | Facility: CLINIC | Age: 72
End: 2022-04-27